# Patient Record
Sex: MALE | Race: WHITE | URBAN - METROPOLITAN AREA
[De-identification: names, ages, dates, MRNs, and addresses within clinical notes are randomized per-mention and may not be internally consistent; named-entity substitution may affect disease eponyms.]

---

## 2019-09-18 ENCOUNTER — HISTORICAL (OUTPATIENT)
Dept: ADMINISTRATIVE | Facility: HOSPITAL | Age: 39
End: 2019-09-18

## 2019-10-09 ENCOUNTER — HISTORICAL (OUTPATIENT)
Dept: ADMINISTRATIVE | Facility: HOSPITAL | Age: 39
End: 2019-10-09

## 2020-07-24 LAB
BILIRUB SERPL-MCNC: NEGATIVE MG/DL
BLOOD URINE, POC: NEGATIVE
CLARITY, POC UA: CLEAR
COLOR, POC UA: COLORLESS
GLUCOSE UR QL STRIP: NEGATIVE
KETONES UR QL STRIP: NEGATIVE
LEUKOCYTE EST, POC UA: NEGATIVE
NITRITE, POC UA: NEGATIVE
PH, POC UA: 6.5
PROTEIN, POC: NEGATIVE
SPECIFIC GRAVITY, POC UA: 1.01
UROBILINOGEN, POC UA: NORMAL

## 2022-04-10 ENCOUNTER — HISTORICAL (OUTPATIENT)
Dept: ADMINISTRATIVE | Facility: HOSPITAL | Age: 42
End: 2022-04-10

## 2022-04-25 VITALS
HEIGHT: 73 IN | WEIGHT: 171.94 LBS | BODY MASS INDEX: 22.79 KG/M2 | SYSTOLIC BLOOD PRESSURE: 106 MMHG | OXYGEN SATURATION: 99 % | DIASTOLIC BLOOD PRESSURE: 68 MMHG

## 2022-05-03 NOTE — HISTORICAL [facility] CERNER
This is a historical note converted from [employer]. Formatting and pictures may have been removed.  Please reference Sommer for original formatting and attached multimedia. Chief Complaint  Right 5th MC fx  History of Present Illness  Patient presents for follow-up visit.? He is being treated for a right fifth metacarpal fracture in a cast.? He has gotten his cast wet.? He is extremely worried about his?right fifth digit turning into the?left fifth digit which began turning in.? He has no skin issues. ?No fevers or chills.? Does not smoke.? Is left-hand-dominant.  Review of Systems  Cardiovascular no chest pain?or palpitations  Lungs  Physical Exam  Vitals & Measurements  HT:?185?cm? WT:?79.5?kg? BMI:?23.23?  GEN: NAD, cooperative with normal mood  CV: RRR by palpation  RESP: normal work of breathing  Right upper extremity:  Tenderness palpation along fifth metacarpal  Patient can make full  with no crossover no rotational deformity  +ain/pin/ulnar  2+ RP  SILT M/U/R  ?   X-ray?3 view of the right hand reviewed significant for fracture healing, no interval displacement.  Assessment/Plan  Closed displaced fracture of neck of fifth metacarpal bone of left hand with delayed healing?S62.337G  Ordered:  XR Hand Right Minimum 3 Views, Routine, 10/09/19 9:50:00 CDT, Follow Up Trauma, None, Ambulatory, Rad Type, Closed displaced fracture of neck of fifth metacarpal bone of left hand with delayed healing, Not Scheduled, 10/09/19 9:50:00 CDT  ?  Patient is now been immobilized for a total of 4 weeks. ?He was switching to a Velcro wrist brace and maral taping at this time. ?He is nonweightbearing for a total of 6 weeks. ?I reiterated to him all of his restrictions and he understands.? He will follow-up with us PRN.   Medications  acetaminophen-hydrocodone 325 mg-5 mg oral tablet, 1 tab(s), Oral, q6hr,? ?Not taking  amoxicillin-clavulanate 875 mg-125 mg oral tablet, 1 tab(s), Oral, BID,? ?Not taking  buPROPion 150 mg/24 hours  (XL) oral tablet, extended release, 150 mg= 1 tab(s), Oral, qAM  buPROPion 300 mg/24 hours (XL) oral tablet, extended release, 300 mg= 1 tab(s), Oral, Daily,? ?Not taking  gabapentin 100 mg oral capsule, 100 mg= 1 cap(s), Oral, TID  hydrOXYzine pamoate 50 mg oral capsule, 50 mg= 1 cap(s), Oral, q6hr,? ?Not taking  loratadine 10 mg oral tablet, 10 mg= 1 tab(s), Oral, Daily,? ?Not taking  meclizine 25 mg oral tablet, 25 mg= 1 tab(s), Oral, TID,? ?Not taking  Pantoprazole 40 mg ORAL EC-Tablet, 40 mg= 1 tab(s), Oral, qAM  Therapeutic Multiple Vitamins with Minerals oral tablet, 1 tab(s), Oral, qAM  traZODONE 50 mg oral tablet ( Desyrel ), 50 mg= 1 tab(s), Oral, qPM,? ?Not taking      Maynor Leal medical record was reviewed with?Dr. Cortés.? HPI, PE and treatment plan was reviewed.? Treatment plan was reasonable and necessary.??Imaging was reviewed at the time of visit.??
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
## 2022-09-21 ENCOUNTER — HISTORICAL (OUTPATIENT)
Dept: ADMINISTRATIVE | Facility: HOSPITAL | Age: 42
End: 2022-09-21